# Patient Record
Sex: FEMALE | Race: BLACK OR AFRICAN AMERICAN | Employment: UNEMPLOYED | URBAN - METROPOLITAN AREA
[De-identification: names, ages, dates, MRNs, and addresses within clinical notes are randomized per-mention and may not be internally consistent; named-entity substitution may affect disease eponyms.]

---

## 2023-11-28 ENCOUNTER — INITIAL PRENATAL (OUTPATIENT)
Age: 28
End: 2023-11-28

## 2023-11-28 VITALS — WEIGHT: 124 LBS | DIASTOLIC BLOOD PRESSURE: 72 MMHG | SYSTOLIC BLOOD PRESSURE: 117 MMHG

## 2023-11-28 DIAGNOSIS — Z34.83 PRENATAL CARE, SUBSEQUENT PREGNANCY, THIRD TRIMESTER: Primary | ICD-10-CM

## 2023-11-28 PROCEDURE — 0500F INITIAL PRENATAL CARE VISIT: CPT | Performed by: OBSTETRICS & GYNECOLOGY

## 2023-11-28 NOTE — PROGRESS NOTES
consciousness, weakness and headaches. Psychiatric/Behavioral: Negative for depression. PHYSICAL EXAM  /72 (Site: Left Upper Arm, Position: Sitting, Cuff Size: Small Adult)   Wt 56.2 kg (124 lb)      Patient is a well-developed well-nourished female no apparent distress  She is alert and oriented x3  Head is normocephalic atraumatic pupils equal round react light accommodation  Neck is supple without adenopathy or thyromegaly  Heart is with regular rate and rhythm without murmurs rubs or gallops  Lungs are clear to auscultation and percussion bilaterally  Abdomen is soft nontender nondistended bowel sounds are present and active  Extremities are without clubbing cyanosis or edema  Pulses are full and symmetric bilaterally  Pelvic  External genitalia within normal limits  Urethra is midline there are no apparent urethral lesions the bladder is within normal limits  Vagina is with normal rugae there is minimal discharge present in the vaginal vault  Cervix is nulliparous, there are no apparent cervical lesions, there is no cervical motion tenderness  Uterus is gravid and 37 weeks size  Adnexa are without masses    ASSESSMENT and PLAN  Normal new OB visit at 39 weeks and 0 days, cervix remains unfavorable  Plan: Follow-up in 1 week for routine OB. [unfilled]    No orders of the defined types were placed in this encounter. @FUP@  Normal new OB visit at 39 weeks and 0 days. Patient previously seen at [address] [place] [address]. Follow-up in 1 week for routine OB.

## 2023-11-29 ENCOUNTER — TELEPHONE (OUTPATIENT)
Age: 28
End: 2023-11-29